# Patient Record
Sex: FEMALE | Race: BLACK OR AFRICAN AMERICAN | Employment: PART TIME | URBAN - METROPOLITAN AREA
[De-identification: names, ages, dates, MRNs, and addresses within clinical notes are randomized per-mention and may not be internally consistent; named-entity substitution may affect disease eponyms.]

---

## 2018-02-05 ENCOUNTER — APPOINTMENT (OUTPATIENT)
Dept: GENERAL RADIOLOGY | Age: 58
End: 2018-02-05
Attending: EMERGENCY MEDICINE
Payer: COMMERCIAL

## 2018-02-05 ENCOUNTER — HOSPITAL ENCOUNTER (EMERGENCY)
Age: 58
Discharge: HOME | End: 2018-02-05
Attending: EMERGENCY MEDICINE | Admitting: EMERGENCY MEDICINE
Payer: COMMERCIAL

## 2018-02-05 VITALS
SYSTOLIC BLOOD PRESSURE: 154 MMHG | BODY MASS INDEX: 47.12 KG/M2 | RESPIRATION RATE: 16 BRPM | DIASTOLIC BLOOD PRESSURE: 94 MMHG | TEMPERATURE: 98.1 F | WEIGHT: 276 LBS | HEART RATE: 83 BPM | HEIGHT: 64 IN | OXYGEN SATURATION: 98 %

## 2018-02-05 DIAGNOSIS — R07.9 CHEST PAIN, UNSPECIFIED TYPE: Primary | ICD-10-CM

## 2018-02-05 DIAGNOSIS — R03.0 ELEVATED BLOOD PRESSURE READING: ICD-10-CM

## 2018-02-05 DIAGNOSIS — R06.02 SOB (SHORTNESS OF BREATH): ICD-10-CM

## 2018-02-05 LAB
ALBUMIN SERPL-MCNC: 3.9 G/DL (ref 3.4–5)
ALBUMIN/GLOB SERPL: 0.8 {RATIO} (ref 0.8–1.7)
ALP SERPL-CCNC: 65 U/L (ref 45–117)
ALT SERPL-CCNC: 20 U/L (ref 13–56)
ANION GAP SERPL CALC-SCNC: 6 MMOL/L (ref 3–18)
AST SERPL-CCNC: 12 U/L (ref 15–37)
BASOPHILS # BLD: 0 K/UL (ref 0–0.06)
BASOPHILS NFR BLD: 0 % (ref 0–2)
BILIRUB SERPL-MCNC: 0.2 MG/DL (ref 0.2–1)
BUN SERPL-MCNC: 18 MG/DL (ref 7–18)
BUN/CREAT SERPL: 21 (ref 12–20)
CALCIUM SERPL-MCNC: 8.9 MG/DL (ref 8.5–10.1)
CHLORIDE SERPL-SCNC: 104 MMOL/L (ref 100–108)
CO2 SERPL-SCNC: 31 MMOL/L (ref 21–32)
CREAT SERPL-MCNC: 0.87 MG/DL (ref 0.6–1.3)
DIFFERENTIAL METHOD BLD: NORMAL
EOSINOPHIL # BLD: 0.1 K/UL (ref 0–0.4)
EOSINOPHIL NFR BLD: 1 % (ref 0–5)
ERYTHROCYTE [DISTWIDTH] IN BLOOD BY AUTOMATED COUNT: 12.9 % (ref 11.6–14.5)
GLOBULIN SER CALC-MCNC: 4.8 G/DL (ref 2–4)
GLUCOSE SERPL-MCNC: 106 MG/DL (ref 74–99)
HCT VFR BLD AUTO: 40.6 % (ref 35–45)
HGB BLD-MCNC: 12.8 G/DL (ref 12–16)
LYMPHOCYTES # BLD: 2.1 K/UL (ref 0.9–3.6)
LYMPHOCYTES NFR BLD: 43 % (ref 21–52)
MCH RBC QN AUTO: 27.9 PG (ref 24–34)
MCHC RBC AUTO-ENTMCNC: 31.5 G/DL (ref 31–37)
MCV RBC AUTO: 88.5 FL (ref 74–97)
MONOCYTES # BLD: 0.3 K/UL (ref 0.05–1.2)
MONOCYTES NFR BLD: 6 % (ref 3–10)
NEUTS SEG # BLD: 2.4 K/UL (ref 1.8–8)
NEUTS SEG NFR BLD: 50 % (ref 40–73)
PLATELET # BLD AUTO: 182 K/UL (ref 135–420)
PMV BLD AUTO: 11.8 FL (ref 9.2–11.8)
POTASSIUM SERPL-SCNC: 3.6 MMOL/L (ref 3.5–5.5)
PROT SERPL-MCNC: 8.7 G/DL (ref 6.4–8.2)
RBC # BLD AUTO: 4.59 M/UL (ref 4.2–5.3)
SODIUM SERPL-SCNC: 141 MMOL/L (ref 136–145)
TROPONIN I BLD-MCNC: <0.04 NG/ML (ref 0–0.08)
TROPONIN I SERPL-MCNC: <0.02 NG/ML (ref 0–0.04)
WBC # BLD AUTO: 4.9 K/UL (ref 4.6–13.2)

## 2018-02-05 PROCEDURE — 93005 ELECTROCARDIOGRAM TRACING: CPT

## 2018-02-05 PROCEDURE — 85025 COMPLETE CBC W/AUTO DIFF WBC: CPT | Performed by: EMERGENCY MEDICINE

## 2018-02-05 PROCEDURE — 84484 ASSAY OF TROPONIN QUANT: CPT | Performed by: EMERGENCY MEDICINE

## 2018-02-05 PROCEDURE — 71046 X-RAY EXAM CHEST 2 VIEWS: CPT

## 2018-02-05 PROCEDURE — 80053 COMPREHEN METABOLIC PANEL: CPT | Performed by: EMERGENCY MEDICINE

## 2018-02-05 PROCEDURE — 99285 EMERGENCY DEPT VISIT HI MDM: CPT

## 2018-02-05 NOTE — ED TRIAGE NOTES
PATIENT STATES THE cp STARTED EARLIER TODAY, WITH SOB, NO RADIATION OF PAIN TO ARM OR BACK, \"FEELS LIKE PRESSURE\"

## 2018-02-06 LAB
ATRIAL RATE: 101 BPM
CALCULATED P AXIS, ECG09: 68 DEGREES
CALCULATED R AXIS, ECG10: 25 DEGREES
CALCULATED T AXIS, ECG11: 48 DEGREES
DIAGNOSIS, 93000: NORMAL
P-R INTERVAL, ECG05: 180 MS
Q-T INTERVAL, ECG07: 378 MS
QRS DURATION, ECG06: 90 MS
QTC CALCULATION (BEZET), ECG08: 490 MS
VENTRICULAR RATE, ECG03: 101 BPM

## 2018-02-06 NOTE — ED PROVIDER NOTES
EMERGENCY DEPARTMENT HISTORY AND PHYSICAL EXAM    8:24 PM      Date: 2/5/2018  Patient Name: Darren Grider    History of Presenting Illness     Chief Complaint   Patient presents with    Chest Pain    Shortness of Breath         History Provided By: Patient    Chief Complaint: chest pain  Duration:  Months  Timing:  Constant  Location: chest  Quality: Sharp and Pressure  Severity: Moderate  Modifying Factors: Taking meds but no relief  Associated Symptoms: shortness of breath      Additional History (Context): Darren Grider is a 62 y.o. female with diabetes, hypertension and thyroid disease, neck mass, lung mass, cardiac ablation, questionable cardiac hisotry who presents with Chest pain.  always has chest pain but is worse today. Has shortness of breath with exertion.  has history of ablation but does not remember when it was.  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 Denies fever or n/v.    PCP: Not On File [facility]    Current Outpatient Prescriptions   Medication Sig Dispense Refill    tramadol (ULTRAM) 50 mg tablet Take 1 tablet by mouth every six (6) hours as needed for Pain. 12 tablet 0    hydrocodone-acetaminophen (NORCO) 5-325 mg per tablet Take 1 tablet by mouth every six (6) hours as needed for Pain. 14 tablet 0    sulfacetamide (BLEPH-10) 10 % ophthalmic solution One drop to right eye 3 times a day for 10 days 10 mL 0    citalopram (CELEXA) 20 mg tablet Take 1 tablet by mouth daily. 30 tablet 1    lisinopril-hydrochlorothiazide (PRINZIDE, ZESTORETIC) 20-25 mg per tablet Take 1 Tab by mouth daily.  30 Tab 1       Past History     Past Medical History:  Past Medical History:   Diagnosis Date    Hypercholesterolemia     Hypertension     Sleep apnea     Thyroid disease     Vitamin deficiency        Past Surgical History:  Past Surgical History:   Procedure Laterality Date    CARDIAC SURG PROCEDURE UNLIST      HX GYN      c section x3    HX THORACOTOMY      HX TYMPANOSTOMY      and reverse       Family History:  Family History   Problem Relation Age of Onset   Dick Bun Stroke Mother     Diabetes Mother     Hypertension Mother     Heart Disease Mother     Cancer Mother     Heart Disease Father        Social History:  Social History   Substance Use Topics    Smoking status: Former Smoker     Quit date: 2/28/2005    Smokeless tobacco: Not on file    Alcohol use No       Allergies:  No Known Allergies      Review of Systems     Constitutional:  Denies malaise, fever, chills. Head:  Denies injury. Face:  Denies injury or pain. ENMT:  Denies sore throat. Neck:  Denies injury or pain. Chest:  Denies injury. Cardiac:  Chest pain  Respiratory:  difficulty breathing, shortness of breath. Denies cough, wheezing,   GI/ABD:  Denies injury, pain, distention, nausea, vomiting, diarrhea. :  Denies injury, pain, dysuria or urgency. Back:  Denies injury or pain. Pelvis:  Denies injury or pain. Extremity/MS:  Denies injury or pain. Neuro:  Denies headache, LOC, dizziness, neurologic symptoms/deficits/paresthesias. Skin: Denies injury, rash, itching or skin changes. Physical Exam     Visit Vitals    BP (!) 154/94    Pulse 83    Temp 98.1 °F (36.7 °C)    Resp 16    Ht 5' 4\" (1.626 m)    Wt 125.2 kg (276 lb)    LMP 06/07/2013    SpO2 98%    BMI 47.38 kg/m2       CONSTITUTIONAL: Alert, in no apparent distress; well-developed; well-nourished. HEAD:  Normocephalic, atraumatic. EYES: PERRL; EOM's intact. ENTM: Nose: No rhinorrhea; Throat: mucous membranes moist. Posterior pharynx-normal.  Neck:  No JVD, supple without lymphadenopathy. Small mass like structure right side of thyroid. RESP: Chest clear, equal breath sounds. CV: S1 and S2 WNL; No murmurs, gallops or rubs. GI: Abdomen soft and non-tender. No masses or organomegaly. UPPER EXT:  Normal inspection. LOWER EXT: Normal inspection.   NEURO: strength 5/5 and sym, sensation intact. SKIN: No rashes; Normal for age and stage. PSYCH:  Alert and oriented, normal affect. Diagnostic Study Results     Labs -  Recent Results (from the past 12 hour(s))   EKG, 12 LEAD, INITIAL    Collection Time: 02/05/18  5:54 PM   Result Value Ref Range    Ventricular Rate 101 BPM    Atrial Rate 101 BPM    P-R Interval 180 ms    QRS Duration 90 ms    Q-T Interval 378 ms    QTC Calculation (Bezet) 490 ms    Calculated P Axis 68 degrees    Calculated R Axis 25 degrees    Calculated T Axis 48 degrees    Diagnosis       Sinus tachycardia  Possible Left atrial enlargement  Borderline ECG  When compared with ECG of 30-OCT-2014 20:19,  No significant change was found     CBC WITH AUTOMATED DIFF    Collection Time: 02/05/18  7:45 PM   Result Value Ref Range    WBC 4.9 4.6 - 13.2 K/uL    RBC 4.59 4.20 - 5.30 M/uL    HGB 12.8 12.0 - 16.0 g/dL    HCT 40.6 35.0 - 45.0 %    MCV 88.5 74.0 - 97.0 FL    MCH 27.9 24.0 - 34.0 PG    MCHC 31.5 31.0 - 37.0 g/dL    RDW 12.9 11.6 - 14.5 %    PLATELET 914 836 - 326 K/uL    MPV 11.8 9.2 - 11.8 FL    NEUTROPHILS 50 40 - 73 %    LYMPHOCYTES 43 21 - 52 %    MONOCYTES 6 3 - 10 %    EOSINOPHILS 1 0 - 5 %    BASOPHILS 0 0 - 2 %    ABS. NEUTROPHILS 2.4 1.8 - 8.0 K/UL    ABS. LYMPHOCYTES 2.1 0.9 - 3.6 K/UL    ABS. MONOCYTES 0.3 0.05 - 1.2 K/UL    ABS. EOSINOPHILS 0.1 0.0 - 0.4 K/UL    ABS.  BASOPHILS 0.0 0.0 - 0.06 K/UL    DF AUTOMATED     METABOLIC PANEL, COMPREHENSIVE    Collection Time: 02/05/18  7:45 PM   Result Value Ref Range    Sodium 141 136 - 145 mmol/L    Potassium 3.6 3.5 - 5.5 mmol/L    Chloride 104 100 - 108 mmol/L    CO2 31 21 - 32 mmol/L    Anion gap 6 3.0 - 18 mmol/L    Glucose 106 (H) 74 - 99 mg/dL    BUN 18 7.0 - 18 MG/DL    Creatinine 0.87 0.6 - 1.3 MG/DL    BUN/Creatinine ratio 21 (H) 12 - 20      GFR est AA >60 >60 ml/min/1.73m2    GFR est non-AA >60 >60 ml/min/1.73m2    Calcium 8.9 8.5 - 10.1 MG/DL    Bilirubin, total 0.2 0.2 - 1.0 MG/DL    ALT (SGPT) 20 13 - 56 U/L    AST (SGOT) 12 (L) 15 - 37 U/L    Alk. phosphatase 65 45 - 117 U/L    Protein, total 8.7 (H) 6.4 - 8.2 g/dL    Albumin 3.9 3.4 - 5.0 g/dL    Globulin 4.8 (H) 2.0 - 4.0 g/dL    A-G Ratio 0.8 0.8 - 1.7     TROPONIN I    Collection Time: 02/05/18  7:45 PM   Result Value Ref Range    Troponin-I, Qt. <0.02 0.0 - 0.045 NG/ML   POC TROPONIN-I    Collection Time: 02/05/18 10:18 PM   Result Value Ref Range    Troponin-I (POC) <0.04 0.00 - 0.08 ng/mL       Radiologic Studies -   XR CHEST PA LAT    (Results Pending)         Medical Decision Making   I am the first provider for this patient. I reviewed the vital signs, available nursing notes, past medical history, past surgical history, family history and social history. Vital Signs-Reviewed the patient's vital signs. Pulse Oximetry Analysis -  98 on room air (Interpretation)wnl      Records Reviewed: Nursing Notes, Old Medical Records, Previous electrocardiograms, Previous Radiology Studies and Previous Laboratory Studies (Time of Review: 8:24 PM)    ED Course: Progress Notes, Reevaluation, and Consults:      Provider Notes (Medical Decision Making):   DDx:  viral vs bacterial URI, asthma exacerbation, COPD, bronchitis, PNE, PE, allergies, pneumothorax, atypical CP, costochondritis/chest wall pain, HF, MI, TAA/AAA, pericarditis, trauma (cardiac contusion, rib Fx, etc), pleuritic chest pain, pleural effusion, intraabdominal process  IMPRESSION AND MEDICAL DECISION MAKING:  Based upon the patient's presentation with noted HPI and PE, along with the work up done in the emergency department, I believe that the patient has chronic chest pain. Pt has an appointment to see cardiologist. Has neck mass that has been evaluated by Pt. Had biopsy recently. States was found not to be cancerous. Pt has no chest pain at this time and no difficulty breathing. Labs are reassuring at this time.  Will discharge and have her follow up with her PCP and cardiologist and schedule a Stress test        Diagnosis     Clinical Impression:   1. Chest pain, unspecified type    2.  SOB (shortness of breath)    3. Elevated blood pressure reading      _______________________________

## 2019-08-06 ENCOUNTER — HOSPITAL ENCOUNTER (EMERGENCY)
Age: 59
Discharge: HOME | End: 2019-08-06
Attending: EMERGENCY MEDICINE
Payer: SELF-PAY

## 2019-08-06 ENCOUNTER — APPOINTMENT (OUTPATIENT)
Dept: VASCULAR SURGERY | Age: 59
End: 2019-08-06
Attending: EMERGENCY MEDICINE
Payer: SELF-PAY

## 2019-08-06 VITALS
TEMPERATURE: 97.3 F | OXYGEN SATURATION: 98 % | WEIGHT: 293 LBS | HEART RATE: 95 BPM | SYSTOLIC BLOOD PRESSURE: 169 MMHG | DIASTOLIC BLOOD PRESSURE: 85 MMHG | RESPIRATION RATE: 18 BRPM | BODY MASS INDEX: 51.49 KG/M2

## 2019-08-06 DIAGNOSIS — G89.29 CHRONIC RIGHT SI JOINT PAIN: ICD-10-CM

## 2019-08-06 DIAGNOSIS — M53.3 CHRONIC RIGHT SI JOINT PAIN: ICD-10-CM

## 2019-08-06 DIAGNOSIS — M25.551 RIGHT HIP PAIN: ICD-10-CM

## 2019-08-06 DIAGNOSIS — M79.661 RIGHT CALF PAIN: Primary | ICD-10-CM

## 2019-08-06 PROCEDURE — 74011000250 HC RX REV CODE- 250: Performed by: EMERGENCY MEDICINE

## 2019-08-06 PROCEDURE — 74011250637 HC RX REV CODE- 250/637: Performed by: EMERGENCY MEDICINE

## 2019-08-06 PROCEDURE — 99284 EMERGENCY DEPT VISIT MOD MDM: CPT

## 2019-08-06 PROCEDURE — 93971 EXTREMITY STUDY: CPT

## 2019-08-06 PROCEDURE — 74011636637 HC RX REV CODE- 636/637: Performed by: EMERGENCY MEDICINE

## 2019-08-06 RX ORDER — CYCLOBENZAPRINE HCL 5 MG
5 TABLET ORAL
Qty: 12 TAB | Refills: 0 | Status: SHIPPED | OUTPATIENT
Start: 2019-08-06

## 2019-08-06 RX ORDER — ACETAMINOPHEN 500 MG
1000 TABLET ORAL
Status: COMPLETED | OUTPATIENT
Start: 2019-08-06 | End: 2019-08-06

## 2019-08-06 RX ORDER — LIDOCAINE 50 MG/G
PATCH TOPICAL
Qty: 15 EACH | Refills: 0 | Status: SHIPPED | OUTPATIENT
Start: 2019-08-06

## 2019-08-06 RX ORDER — LIDOCAINE 4 G/100G
1 PATCH TOPICAL EVERY 24 HOURS
Status: DISCONTINUED | OUTPATIENT
Start: 2019-08-06 | End: 2019-08-06 | Stop reason: HOSPADM

## 2019-08-06 RX ORDER — IBUPROFEN 600 MG/1
600 TABLET ORAL
Status: COMPLETED | OUTPATIENT
Start: 2019-08-06 | End: 2019-08-06

## 2019-08-06 RX ORDER — METHYLPREDNISOLONE 4 MG/1
TABLET ORAL
Qty: 1 DOSE PACK | Refills: 0 | Status: SHIPPED | OUTPATIENT
Start: 2019-08-06

## 2019-08-06 RX ORDER — IBUPROFEN 600 MG/1
600 TABLET ORAL
Qty: 20 TAB | Refills: 0 | Status: SHIPPED | OUTPATIENT
Start: 2019-08-06

## 2019-08-06 RX ORDER — PREDNISONE 20 MG/1
60 TABLET ORAL
Status: COMPLETED | OUTPATIENT
Start: 2019-08-06 | End: 2019-08-06

## 2019-08-06 RX ADMIN — IBUPROFEN 600 MG: 600 TABLET, FILM COATED ORAL at 11:26

## 2019-08-06 RX ADMIN — ACETAMINOPHEN 1000 MG: 500 TABLET, FILM COATED ORAL at 11:26

## 2019-08-06 RX ADMIN — PREDNISONE 60 MG: 20 TABLET ORAL at 11:26

## 2019-08-06 NOTE — ED PROVIDER NOTES
EMERGENCY DEPARTMENT HISTORY AND PHYSICAL EXAM      Date: 8/6/2019  Patient Name: Faisal Hughes    History of Presenting Illness     Chief Complaint   Patient presents with    Back Pain     right lower     Leg Pain     right lower       History Provided By: Patient      HPI/Chief Complaint: (Context):who presents with chief complaint of right calf pain radiating up into the hip  Also right knee pain also right hip pain also right buttock pain. No midline spinal pain  No fever no chills no vomiting diarrhea chest pain or shortness of breath no abdominal pain no dysuria excellent patient says symptoms occur positionally with movement not constant sharp pain shooting up and down the leg there is no numbness no weakness no trauma patient's been having the symptoms for last 3 weeks. Patient seen another physician few weeks ago and only had a physical exam which did not reveal anything in her current medication regimen is not working.  ---------  Patient's triage note is reviewed patient's vitals are with high blood pressure 169/85, pulse is 102, rest of vitals are stable in the emergency department, nurse's note is reviewed  Patient has no known allergies  Patient's home medication include Celexa, Norco  Patient's past medical history includes hypertension thyroid disease cholesterolemia  Surgical history includes gynecological surgery thoracotomy  Social history includes former smoker no alcohol use      PCP: Other, MD Kym    Current Facility-Administered Medications   Medication Dose Route Frequency Provider Last Rate Last Dose    lidocaine 4 % patch 1 Patch  1 Patch TransDERmal Q24H Paulina Umaña MD   1 Patch at 08/06/19 1126     Current Outpatient Medications   Medication Sig Dispense Refill    methylPREDNISolone (MEDROL, TIA,) 4 mg tablet Use as prescribed by manufacture 1 Dose Pack 0    cyclobenzaprine (FLEXERIL) 5 mg tablet Take 1 Tab by mouth three (3) times daily as needed for Muscle Spasm(s).  12 Tab 0    ibuprofen (MOTRIN) 600 mg tablet Take 1 Tab by mouth every six (6) hours as needed for Pain. 20 Tab 0    lidocaine (LIDODERM) 5 % Apply patch to the affected area for 12 hours a day and remove for 12 hours a day. 15 Each 0    hydrocodone-acetaminophen (NORCO) 5-325 mg per tablet Take 1 tablet by mouth every six (6) hours as needed for Pain. 14 tablet 0    sulfacetamide (BLEPH-10) 10 % ophthalmic solution One drop to right eye 3 times a day for 10 days 10 mL 0    citalopram (CELEXA) 20 mg tablet Take 1 tablet by mouth daily. 30 tablet 1    lisinopril-hydrochlorothiazide (PRINZIDE, ZESTORETIC) 20-25 mg per tablet Take 1 Tab by mouth daily. 27 Tab 1       Past History     Past Medical History:  Past Medical History:   Diagnosis Date    Hypercholesterolemia     Hypertension     Sleep apnea     Thyroid disease     Vitamin deficiency        Past Surgical History:  Past Surgical History:   Procedure Laterality Date    CARDIAC SURG PROCEDURE UNLIST      HX GYN      c section x3    HX THORACOTOMY      HX TYMPANOSTOMY      and reverse       Family History:  Family History   Problem Relation Age of Onset    Stroke Mother     Diabetes Mother     Hypertension Mother     Heart Disease Mother     Cancer Mother     Heart Disease Father        Social History:  Social History     Tobacco Use    Smoking status: Former Smoker     Last attempt to quit: 2005     Years since quittin.4   Substance Use Topics    Alcohol use: No    Drug use: No       Allergies:  No Known Allergies      Review of Systems   Review of Systems   Constitutional: Negative for activity change, fatigue and fever. HENT: Negative for congestion and rhinorrhea. Eyes: Negative for visual disturbance. Respiratory: Negative for shortness of breath. Cardiovascular: Negative for chest pain and palpitations. Gastrointestinal: Negative for abdominal pain, diarrhea, nausea and vomiting.    Genitourinary: Negative for dysuria and hematuria. Musculoskeletal: Positive for arthralgias, gait problem, joint swelling and myalgias. Negative for back pain, neck pain and neck stiffness. Skin: Negative for rash. Neurological: Negative for dizziness, weakness and light-headedness. Psychiatric/Behavioral: Negative for agitation. All other systems reviewed and are negative. Physical Exam     Physical Exam   Constitutional: She appears well-developed and well-nourished. No distress. HENT:   Head: Normocephalic and atraumatic. Right Ear: External ear normal.   Left Ear: External ear normal.   Nose: Nose normal.   Mouth/Throat: Oropharynx is clear and moist.   Eyes: Pupils are equal, round, and reactive to light. Conjunctivae and EOM are normal. No scleral icterus. Neck: Normal range of motion. Neck supple. No JVD present. No tracheal deviation present. No thyromegaly present. Cardiovascular: Normal rate and regular rhythm. Exam reveals no friction rub. No murmur heard. Pulmonary/Chest: Effort normal and breath sounds normal. No stridor. She exhibits no tenderness. Abdominal: Soft. Bowel sounds are normal. She exhibits no distension. There is no tenderness. There is no rebound and no guarding. Musculoskeletal: Normal range of motion. She exhibits no edema or tenderness. Lymphadenopathy:     She has no cervical adenopathy. Neurological: She is alert. No cranial nerve deficit. Coordination normal.   Skin: Skin is warm and dry. Psychiatric: She has a normal mood and affect. Her behavior is normal. Judgment and thought content normal.   Nursing note and vitals reviewed. Medical Decision Making   I am the first provider for this patient. I reviewed the vital signs, available nursing notes, past medical history, past surgical history, family history and social history.     Provider Notes (Medical Decision Making): Patient with no focal deficits no trauma  Positional sharp pain in the left lower extremity  Symptomatic relief the only thing that has not been clearly ruled out my mind is a DVT as patient has calf tenderness directly    No chest pain no shortness of breath no further work-up is necessary if patient's duplex is negative symptomatic relief be provided anti-inflammatory will be provided and orthopedic follow-up as requested. Vital Signs-Reviewed the patient's vital signs. Pulse Oximetry Analysis - 98% on room air oxygen, normal    Vitals:    08/06/19 0946 08/06/19 1129   BP: 169/85    Pulse: (!) 102 95   Resp: 18    Temp: 97.3 °F (36.3 °C)    SpO2: 98%    Weight: 136.1 kg (300 lb)      Records Reviewed: Nursing Notes  Discharge Note:  11:30 AM  The pt is ready for discharge. The pt's signs, symptoms, diagnosis, and discharge instructions have been discussed and pt has conveyed their understanding. The pt is to follow up as recommended or return to ER should their symptoms worsen. Plan has been discussed and pt is in agreement. Diagnostic Study Results     Labs -   No results found for this or any previous visit (from the past 12 hour(s)). Radiologic Studies -   No orders to display     CT Results  (Last 48 hours)    None        CXR Results  (Last 48 hours)    None          Discharge     Clinical Impression:   1. Right calf pain    2. Right hip pain    3.  Chronic right SI joint pain      Disposition:  Home    It should be noted that I will be the provider of record for this patient  Carmen Gonzalez MD, RDMS, FACEP    Follow-up Information     Follow up With Specialties Details Why Contact formerly [facility] EMERGENCY DEPT Emergency Medicine  If symptoms worsen [address] [place] [address] Derek Nogueira Se, [address] [facility] Katie Delgado MD Orthopedic Surgery Call in 1 day Follow Up From Emergency Department [id number] [address] [address] [address] Isaura Eldridge Call in 1 day Follow Up From Emergency Department [address] [facility]  [phone]          Current Discharge Medication List      START taking these medications    Details   methylPREDNISolone (MEDROL, TIA,) 4 mg tablet Use as prescribed by manufacture  Qty: 1 Dose Pack, Refills: 0      cyclobenzaprine (FLEXERIL) 5 mg tablet Take 1 Tab by mouth three (3) times daily as needed for Muscle Spasm(s). Qty: 12 Tab, Refills: 0      ibuprofen (MOTRIN) 600 mg tablet Take 1 Tab by mouth every six (6) hours as needed for Pain. Qty: 20 Tab, Refills: 0      lidocaine (LIDODERM) 5 % Apply patch to the affected area for 12 hours a day and remove for 12 hours a day.   Qty: 15 Each, Refills: 0

## 2019-08-06 NOTE — LETTER
[address] [facility] EMERGENCY DEPT 
[address] 
[address] [facility] [address] [phone] Work/School Note Date: 8/6/2019 To Whom It May concern: 
 
Mirna Pandey was seen and treated today in the emergency room by the following provider(s): 
Attending Provider: Reagan Holstein, MD.   
 
Mirna Pandey may return to work 08/09/2019 Sincerely, Ronit Worthy MD

## 2019-08-06 NOTE — DISCHARGE INSTRUCTIONS
Patient Education        Hip Pain: Care Instructions  Your Care Instructions    Hip pain may be caused by many things, including overuse, a fall, or a twisting movement. Another cause of hip pain is arthritis. Your pain may increase when you stand up, walk, or squat. The pain may come and go or may be constant. Home treatment can help relieve hip pain, swelling, and stiffness. If your pain is ongoing, you may need more tests and treatment. Follow-up care is a key part of your treatment and safety. Be sure to make and go to all appointments, and call your doctor if you are having problems. It's also a good idea to know your test results and keep a list of the medicines you take. How can you care for yourself at home? · Take pain medicines exactly as directed. ? If the doctor gave you a prescription medicine for pain, take it as prescribed. ? If you are not taking a prescription pain medicine, ask your doctor if you can take an over-the-counter medicine. · Rest and protect your hip. Take a break from any activity, including standing or walking, that may cause pain. · Put ice or a cold pack against your hip for 10 to 20 minutes at a time. Try to do this every 1 to 2 hours for the next 3 days (when you are awake) or until the swelling goes down. Put a thin cloth between the ice and your skin. · Sleep on your healthy side with a pillow between your knees, or sleep on your back with pillows under your knees. · If there is no swelling, you can put moist heat, a heating pad, or a warm cloth on your hip. Do gentle stretching exercises to help keep your hip flexible. · Learn how to prevent falls. Have your vision and hearing checked regularly. Wear slippers or shoes with a nonskid sole. · Stay at a healthy weight. · Wear comfortable shoes. When should you call for help? Call 911 anytime you think you may need emergency care.  For example, call if:    · You have sudden chest pain and shortness of breath, or you cough up blood.     · You are not able to stand or walk or bear weight.     · Your buttocks, legs, or feet feel numb or tingly.     · Your leg or foot is cool or pale or changes color.     · You have severe pain.    Call your doctor now or seek immediate medical care if:    · You have signs of infection, such as:  ? Increased pain, swelling, warmth, or redness in the hip area. ? Red streaks leading from the hip area. ? Pus draining from the hip area. ? A fever.     · You have signs of a blood clot, such as:  ? Pain in your calf, back of the knee, thigh, or groin. ? Redness and swelling in your leg or groin.     · You are not able to bend, straighten, or move your leg normally.     · You have trouble urinating or having bowel movements.    Watch closely for changes in your health, and be sure to contact your doctor if:    · You do not get better as expected. Where can you learn more? Go to http://carlos-sakina.info/. Enter S158 in the search box to learn more about \"Hip Pain: Care Instructions. \"  Current as of: September 23, 2018  Content Version: 12.1  © 4487-8051 [employer]. Care instructions adapted under license by [employer] (which disclaims liability or warranty for this information). If you have questions about a medical condition or this instruction, always ask your healthcare professional. [place] [address] any warranty or liability for your use of this information. Patient Education        Joint Pain: Care Instructions  Your Care Instructions    Many people have small aches and pains from overuse or injury to muscles and joints. Joint injuries often happen during sports or recreation, work tasks, or projects around the home. An overuse injury can happen when you put too much stress on a joint or when you do an activity that stresses the joint over and over, such as using the computer or rowing a boat.   You can take action at home to help your muscles and joints get better. You should feel better in 1 to 2 weeks, but it can take 3 months or more to heal completely. Follow-up care is a key part of your treatment and safety. Be sure to make and go to all appointments, and call your doctor if you are having problems. It's also a good idea to know your test results and keep a list of the medicines you take. How can you care for yourself at home? · Do not put weight on the injured joint for at least a day or two. · For the first day or two after an injury, do not take hot showers or baths, and do not use hot packs. The heat could make swelling worse. · Put ice or a cold pack on the sore joint for 10 to 20 minutes at a time. Try to do this every 1 to 2 hours for the next 3 days (when you are awake) or until the swelling goes down. Put a thin cloth between the ice and your skin. · Wrap the injury in an elastic bandage. Do not wrap it too tightly because this can cause more swelling. · Prop up the sore joint on a pillow when you ice it or anytime you sit or lie down during the next 3 days. Try to keep it above the level of your heart. This will help reduce swelling. · Take an over-the-counter pain medicine, such as acetaminophen (Tylenol), ibuprofen (Advil, Motrin), or naproxen (Aleve). Read and follow all instructions on the label. · After 1 or 2 days of rest, begin moving the joint gently. While the joint is still healing, you can begin to exercise using activities that do not strain or hurt the painful joint. When should you call for help? Call your doctor now or seek immediate medical care if:    · You have signs of infection, such as:  ? Increased pain, swelling, warmth, and redness. ? Red streaks leading from the joint. ? A fever.    Watch closely for changes in your health, and be sure to contact your doctor if:    · Your movement or symptoms are not getting better after 1 to 2 weeks of home treatment.    Where can you learn more?  Go to http://carlos-sakina.info/. Enter P205 in the search box to learn more about \"Joint Pain: Care Instructions. \"  Current as of: September 20, 2018  Content Version: 12.1  © 6368-5898 [employer]. Care instructions adapted under license by [employer] (which disclaims liability or warranty for this information). If you have questions about a medical condition or this instruction, always ask your healthcare professional. [facility] [address] any warranty or liability for your use of this information. Patient Education        Leg Pain: Care Instructions  Your Care Instructions  Many things can cause leg pain. Too much exercise or overuse can cause a muscle cramp (or charley horse). You can get leg cramps from not eating a balanced diet that has enough potassium, calcium, and other minerals. If you do not drink enough fluids or are taking certain medicines, you may develop leg cramps. Other causes of leg pain include injuries, blood flow problems, nerve damage, and twisted and enlarged veins (varicose veins). You can usually ease pain with self-care. Your doctor may recommend that you rest your leg and keep it elevated. Follow-up care is a key part of your treatment and safety. Be sure to make and go to all appointments, and call your doctor if you are having problems. It's also a good idea to know your test results and keep a list of the medicines you take. How can you care for yourself at home? · Take pain medicines exactly as directed. ? If the doctor gave you a prescription medicine for pain, take it as prescribed. ? If you are not taking a prescription pain medicine, ask your doctor if you can take an over-the-counter medicine. · Take any other medicines exactly as prescribed. Call your doctor if you think you are having a problem with your medicine.   · Rest your leg while you have pain, and avoid standing for long periods of time.  · Prop up your leg at or above the level of your heart when possible. · Make sure you are eating a balanced diet that is rich in calcium, potassium, and magnesium, especially if you are pregnant. · If directed by your doctor, put ice or a cold pack on the area for 10 to 20 minutes at a time. Put a thin cloth between the ice and your skin. · Your leg may be in a splint, a brace, or an elastic bandage, and you may have crutches to help you walk. Follow your doctor's directions about how long to wear supports and how to use the crutches. When should you call for help? Call 911 anytime you think you may need emergency care. For example, call if:    · You have sudden chest pain and shortness of breath, or you cough up blood.     · Your leg is cool or pale or changes color.    Call your doctor now or seek immediate medical care if:    · You have increasing or severe pain.     · Your leg suddenly feels weak and you cannot move it.     · You have signs of a blood clot, such as:  ? Pain in your calf, back of the knee, thigh, or groin. ? Redness and swelling in your leg or groin.     · You have signs of infection, such as:  ? Increased pain, swelling, warmth, or redness. ? Red streaks leading from the sore area. ? Pus draining from a place on your leg. ? A fever.     · You cannot bear weight on your leg.    Watch closely for changes in your health, and be sure to contact your doctor if:    · You do not get better as expected. Where can you learn more? Go to http://carlos-sakina.info/. Enter W533 in the search box to learn more about \"Leg Pain: Care Instructions. \"  Current as of: September 23, 2018  Content Version: 12.1  © 9503-2144 [employer]. Care instructions adapted under license by [employer] (which disclaims liability or warranty for this information).  If you have questions about a medical condition or this instruction, always ask your healthcare professional. [address] any warranty or liability for your use of this information.

## 2019-08-06 NOTE — ED TRIAGE NOTES
Pt reports lower right back,buttocks and right leg pain times 3 weeks. Pt states it started 3 weeks ago with right calf pain and now that pain has progressed to her entire leg, lower back on the right side and buttocks. Pt states she has pain in her lower back when she coughs and pain in her rectum when she passes gas.

## 2019-08-06 NOTE — LETTER
[facility] EMERGENCY DEPT 
[address] 
[address] [facility] [address] [phone] Work/School Note Date: 8/6/2019 To Whom It May concern: 
 
Ghazal Cabral was seen and treated today in the emergency room by the following provider(s): 
Attending Provider: Bridgette Nuñez MD.   
 
Ghazal Cabral may return to work on 08/07/2019. Sincerely, Froylan Sheets RN

## 2019-08-06 NOTE — ED NOTES
I have reviewed discharge instructions with the patient. The patient verbalized understanding. Patient armband removed and shredded. Pt  Used crutches on discharge.